# Patient Record
Sex: MALE
[De-identification: names, ages, dates, MRNs, and addresses within clinical notes are randomized per-mention and may not be internally consistent; named-entity substitution may affect disease eponyms.]

---

## 2022-07-24 ENCOUNTER — APPOINTMENT (OUTPATIENT)
Dept: AFTER HOURS CARE | Facility: EMERGENCY ROOM | Age: 77
End: 2022-07-24

## 2022-07-24 DIAGNOSIS — U07.1 COVID-19: ICD-10-CM

## 2022-07-24 PROCEDURE — 99204 OFFICE O/P NEW MOD 45 MIN: CPT | Mod: 95

## 2022-07-25 PROBLEM — U07.1 COVID-19 VIRUS INFECTION: Status: ACTIVE | Noted: 2022-07-25

## 2022-07-25 PROBLEM — Z00.00 ENCOUNTER FOR PREVENTIVE HEALTH EXAMINATION: Status: ACTIVE | Noted: 2022-07-25

## 2022-07-25 NOTE — PLAN
[FreeTextEntry1] : 1)	Will send the Rx for Paxlovid to [facility] kiah kumar raymundo [id number]\par 2)	Pt advised to hold statin for duration of paxlovid use and restart 2 days after completion of course\par 3)	Pt advised to take ibuprofen or acetaminophen as needed for bodyaches or fevers\par 4)	Pt encouraged to hydrate with Gatorade\par 5)	Pt counseled on warning signs of severe symptoms necessitating ER evaluation\par 6)	Followup w/ PMD\par

## 2022-07-25 NOTE — HISTORY OF PRESENT ILLNESS
[Home] : at home, [unfilled] , at the time of the visit. [Other Location: e.g. Home (Enter Location, City,State)___] : at [unfilled] [Verbal consent obtained from patient] : the patient, [unfilled] [FreeTextEntry8] : 77 Yo m PMH of CAD s/p cabg, on Atenolol, losartan, vitamin d, Pepcid, aspirin, atorvastatin, no history of renal disease, now c/o covid symptoms. Pt woke up today with headache, cough and tested Positive for covid today. No fevers, no dyspnea.  [Spouse] : spouse

## 2022-07-25 NOTE — PHYSICAL EXAM
[de-identified] : General: pt appears stated age and is in nad, speaking in full clear sentences\par HEENT: AT/NC, pink conjunctiva, anicteric sclerae, EOMI, mmm\par Neck: full ROM, trachea midline\par Lungs: no respiratory distress\par Neuro: awake, alert, responsive; oriented to person, place and time; cranial nerves grossly intact, EOMI intact jaw movement, no facial asymmetry, hearing intact\par \par